# Patient Record
Sex: FEMALE | Race: BLACK OR AFRICAN AMERICAN | URBAN - METROPOLITAN AREA
[De-identification: names, ages, dates, MRNs, and addresses within clinical notes are randomized per-mention and may not be internally consistent; named-entity substitution may affect disease eponyms.]

---

## 2021-06-15 ENCOUNTER — TELEPHONE (OUTPATIENT)
Dept: OBGYN CLINIC | Age: 21
End: 2021-06-15

## 2021-06-15 NOTE — TELEPHONE ENCOUNTER
LMP 4/23/21 7w4d pregnant    She has not been seen in our office yet-she is scheduled for this week. She is c/o spotting only when she wipes. Denies heavy bleeding/clots and cramping. She states she did have a miscarriage last year. I advised her to keep her appt and that if the bleeding gets worse then she would need to see medical care. She verbalized understanding.